# Patient Record
Sex: MALE
[De-identification: names, ages, dates, MRNs, and addresses within clinical notes are randomized per-mention and may not be internally consistent; named-entity substitution may affect disease eponyms.]

---

## 2020-07-09 PROBLEM — Z00.00 ENCOUNTER FOR PREVENTIVE HEALTH EXAMINATION: Status: ACTIVE | Noted: 2020-07-09

## 2020-08-26 ENCOUNTER — APPOINTMENT (OUTPATIENT)
Dept: UROLOGY | Facility: CLINIC | Age: 65
End: 2020-08-26

## 2020-09-02 ENCOUNTER — APPOINTMENT (OUTPATIENT)
Dept: UROLOGY | Facility: CLINIC | Age: 65
End: 2020-09-02
Payer: MEDICARE

## 2020-09-02 PROCEDURE — 99214 OFFICE O/P EST MOD 30 MIN: CPT | Mod: 95

## 2020-09-02 NOTE — ASSESSMENT
[FreeTextEntry1] : We discussed changes to improve his erections and to allow more spontaneity in is sexual activity.  I ordered for him tadalafil 5 mg daily to be taken daily. I also ordered UA, urine C+S and cytology, FISH and PSA testing for his LUTS and microhematuria. We will also get a scrotal US and a bladder US to check on the right spermatocele and his PVR and prostate. We will plan to meet in 3 months for TYLER. We ended the video portion of the visit at 9:51 AM. Aniya Riggs MD\par

## 2020-09-02 NOTE — HISTORY OF PRESENT ILLNESS
[Other Location: e.g. Home (Enter Location, City,State)___] : at [unfilled] [Home] : at home, [unfilled] , at the time of the visit. [Verbal consent obtained from patient] : the patient, [unfilled] [FreeTextEntry1] :  The patient-doctor relationship has been established in a face to face fashion via real time video/audio HIPAA compliant communication using telemedicine software. The patient's identity has been confirmed. The patient was previously emailed a copy of the telemedicine consent. They have had a chance to review and has now given verbal consent and has requested care to be assessed and treated through telemedicine and understands there maybe limitations in this process and they may need further follow up care in the office and or hospital settings. The patient was unable to connect via [employer] and requested that we use an alternative platform ([employer]).\par Verbal consent given on 9/2/2020, at 9:20 AM, by Mauricio Burks.\par \par This 65 year old male was last seen on 7/18/2018 for microhematuria, right spermatocele,variable PSA and ED. He comes for interval reassessment and treatment. He is working form home in [place] and is urinating with a good stream, good out put stable nocturia x 0-2 and no gross hematuria. \par \par As for his ED, he typically uses sildenafil 100 mg but over the last 4-5 months, he has had difficulty achieving a full erection. He also tells me that it is difficult to always plan in advance using the sildenafil. His right spermatocele sometimes appears to be larger.\par \par Patient remains on Lipitor for his cholesterol and has NKMA.  The patient denies fevers, chills, nausea and or vomiting and no unexplained weight loss. All pertinent parts of the patient PFSH (past medical, family and social histories), laboratory, radiological studies and physician notes were reviewed prior to starting the face to face portion of the telemedicine visit. Questionnaire results were discussed with patient.\par

## 2021-06-25 ENCOUNTER — APPOINTMENT (OUTPATIENT)
Dept: UROLOGY | Facility: CLINIC | Age: 66
End: 2021-06-25
Payer: MEDICARE

## 2021-06-25 VITALS
HEIGHT: 74 IN | DIASTOLIC BLOOD PRESSURE: 79 MMHG | HEART RATE: 76 BPM | BODY MASS INDEX: 23.1 KG/M2 | SYSTOLIC BLOOD PRESSURE: 135 MMHG | WEIGHT: 180 LBS | TEMPERATURE: 98.4 F

## 2021-06-25 PROCEDURE — 99214 OFFICE O/P EST MOD 30 MIN: CPT

## 2021-06-25 PROCEDURE — 51798 US URINE CAPACITY MEASURE: CPT

## 2021-06-25 PROCEDURE — 99072 ADDL SUPL MATRL&STAF TM PHE: CPT

## 2021-06-25 NOTE — HISTORY OF PRESENT ILLNESS
[FreeTextEntry1] :  This 65 year old male was  seen on 7/18/2018 for microhematuria, right spermatocele,variable PSA and ED. \par \par Patient seen 9/2/2020  for interval reassessment and treatment. He is working form home in [place] and is urinating with a good stream, good out put stable nocturia x 0-2 and no gross hematuria. \par \par As for his ED, he typically uses sildenafil 100 mg but over the last 4-5 months, he has had difficulty achieving a full erection. He also tells me that it is difficult to always plan in advance using the sildenafil. His right spermatocele sometimes appears to be larger. We changed to tadalafil 5 mg daily blood urine and US tests ordered.\par \par Patient seen TODAY  6/25/2021 to review results and reassess the microscopic hematuria, spermatoceles, PSA and ED. US bladder and scrotum from 10/23/2020 showed PVR 83 cc, prostate 40 gm, no testicular masses and bilateral stable spermatoceles.\par \par Patient told me that he feels good right now and is urinating well. He has also not noted any significant difference in the right spermatocele. HIs main issue is with his ED for which he is taking tadalafil 5 mg daily with 2 - 3 extra an hour before sexual activity. Despite this Rx, he still does not function consistently.\par \par UA trace protein\par IPSS 5/35\par JÚNIOR 17/25\par KAYLYN 2/10\par PVR 0\par \par Patient remains on Lipitor for his cholesterol and has NKMA.  The patient denies fevers, chills, nausea and or vomiting and no unexplained weight loss. All pertinent parts of the patient PFSH (past medical, family and social histories), laboratory, radiological studies and physician notes were reviewed prior to starting the face to face portion of the telemedicine visit. Questionnaire results were discussed with patient.\par

## 2021-06-25 NOTE — ASSESSMENT
[FreeTextEntry1] : We discussed the patient's ongoing issues with erectile dysfunction and I recommended that he maintain the tadalafil 5 mg daily.  However I recommended that he try sildenafil citrate 50 mg to 100 mg an hour before sexual activity to augment the tadalafil.  Both of these were ordered for him.  We discussed the indications risks alternatives and chances for success with this approach.  As for his stable right spermatocele, he will be due for an ultrasound in the next 2 to 3 months and I gave him the requisition to have this done in [place].  His microscopic hematuria is also stable and we did send urine today for culture cytology and fish test and blood for PSA exam.  If all remains stable I will see him in 1 year. Aniya Riggs MD\par

## 2021-06-25 NOTE — PHYSICAL EXAM
[General Appearance - Well Developed] : well developed [General Appearance - Well Nourished] : well nourished [Well Groomed] : well groomed [Normal Appearance] : normal appearance [General Appearance - In No Acute Distress] : no acute distress [Abdomen Soft] : soft [Abdomen Tenderness] : non-tender [Costovertebral Angle Tenderness] : no ~M costovertebral angle tenderness [Abdomen Hernia] : no hernia was discovered [Urethral Meatus] : meatus normal [Urinary Bladder Findings] : the bladder was normal on palpation [Penis Abnormality] : normal uncircumcised penis [Testes Tenderness] : no tenderness of the testes [Testes Mass (___cm)] : there were no testicular masses [Prostate Tenderness] : the prostate was not tender [No Prostate Nodules] : no prostate nodules [Prostate Size ___ (0-4)] : prostate size [unfilled] (scale: 0-4) [Skin Color & Pigmentation] : normal skin color and pigmentation [Skin Turgor] : supple [Edema] : no peripheral edema [] : no respiratory distress [Exaggerated Use Of Accessory Muscles For Inspiration] : no accessory muscle use [Oriented To Time, Place, And Person] : oriented to person, place, and time [Affect] : the affect was normal [Mood] : the mood was normal [Not Anxious] : not anxious [FreeTextEntry1] : stable moderate rigt spermatocele with no change from last exam.

## 2021-06-28 LAB
BACTERIA UR CULT: NORMAL
PSA SERPL-MCNC: 0.84 NG/ML

## 2021-06-29 LAB
HLX UV FISH FINAL REPORT: NORMAL
URINE CYTOLOGY: NORMAL

## 2022-04-13 ENCOUNTER — APPOINTMENT (OUTPATIENT)
Dept: UROLOGY | Facility: CLINIC | Age: 67
End: 2022-04-13
Payer: MEDICARE

## 2022-04-13 DIAGNOSIS — R35.1 NOCTURIA: ICD-10-CM

## 2022-04-13 DIAGNOSIS — N43.40 SPERMATOCELE OF EPIDIDYMIS, UNSPECIFIED: ICD-10-CM

## 2022-04-13 LAB
BILIRUB UR QL STRIP: NORMAL
CLARITY UR: CLEAR
COLLECTION METHOD: NORMAL
GLUCOSE UR-MCNC: NORMAL
HCG UR QL: 1 EU/DL
HGB UR QL STRIP.AUTO: NORMAL
KETONES UR-MCNC: NORMAL
LEUKOCYTE ESTERASE UR QL STRIP: NORMAL
NITRITE UR QL STRIP: NORMAL
PH UR STRIP: 7
PROT UR STRIP-MCNC: NORMAL
SP GR UR STRIP: 1.01

## 2022-04-13 PROCEDURE — 99214 OFFICE O/P EST MOD 30 MIN: CPT

## 2022-04-13 PROCEDURE — 81003 URINALYSIS AUTO W/O SCOPE: CPT | Mod: QW

## 2022-04-13 PROCEDURE — 51798 US URINE CAPACITY MEASURE: CPT

## 2022-04-13 RX ORDER — SILDENAFIL 100 MG/1
100 TABLET, FILM COATED ORAL
Qty: 30 | Refills: 6 | Status: ACTIVE | COMMUNITY
Start: 2021-06-25 | End: 1900-01-01

## 2022-04-13 NOTE — PHYSICAL EXAM
[General Appearance - Well Developed] : well developed [General Appearance - Well Nourished] : well nourished [Well Groomed] : well groomed [Normal Appearance] : normal appearance [General Appearance - In No Acute Distress] : no acute distress [Abdomen Soft] : soft [Abdomen Tenderness] : non-tender [Costovertebral Angle Tenderness] : no ~M costovertebral angle tenderness [Urethral Meatus] : meatus normal [Penis Abnormality] : normal circumcised penis [Urinary Bladder Findings] : the bladder was normal on palpation [Scrotum] : the scrotum was normal [Testes Tenderness] : no tenderness of the testes [Testes Mass (___cm)] : there were no testicular masses [Prostate Tenderness] : the prostate was not tender [No Prostate Nodules] : no prostate nodules [Prostate Size ___ (0-4)] : prostate size [unfilled] (scale: 0-4) [FreeTextEntry1] : Stable moderate right spermatocele, soft, no masses palpable. [Edema] : no peripheral edema [] : no respiratory distress [Respiration, Rhythm And Depth] : normal respiratory rhythm and effort [Exaggerated Use Of Accessory Muscles For Inspiration] : no accessory muscle use [Oriented To Time, Place, And Person] : oriented to person, place, and time [Affect] : the affect was normal [Mood] : the mood was normal [Not Anxious] : not anxious [Normal Station and Gait] : the gait and station were normal for the patient's age [No Focal Deficits] : no focal deficits

## 2022-04-13 NOTE — ASSESSMENT
[FreeTextEntry1] : We discussed patient's chronic microscopic hematuria which is not present in his urinalysis today.  Because of his past history I did send urine for culture and cytology.  If these are negative we will continue to follow this in 1 year.\par \par As for the BPH, his urinary symptoms are mild and no intervention is indicated at this time again we will follow this in 1 year.\par \par As for the ED, he has not enjoyed a good response to the combination of tadalafil 5 mg daily and sildenafil 100 mg as needed on top of this.  We discussed other options moving forward and this would require duplex ultrasound evaluation of the penis with ICI as the next step.  The goal would be to achieve a full rigid erection using ICI as a therapy if possible.  I explained the indications risks alternatives and chances for success with this approach in detail to Mr. Caballero.  He is considering this option but at the present time is not yet ready to embark on this path.  He requested and I reordered for him the tadalafil 5 mg at [facility] and the sildenafil 100 mg at [facility].  Blood was also sent for PSA test today.\par \par If the blood and urine studies remain stable, I will see him in 1 year unless he decides to proceed with penile blood flow testing before that. Aniya Riggs MD\par

## 2022-04-13 NOTE — HISTORY OF PRESENT ILLNESS
[FreeTextEntry1] : 66 YO M  seen on 7/18/2018 for microhematuria, right spermatocele,variable PSA and ED. \par \par Patient seen 9/2/2020  for interval reassessment and treatment. He is working form home in [place] and is urinating with a good stream, good out put stable nocturia x 0-2 and no gross hematuria. \par \par As for his ED, he typically uses sildenafil 100 mg but over the last 4-5 months, he has had difficulty achieving a full erection. He also tells me that it is difficult to always plan in advance using the sildenafil. His right spermatocele sometimes appears to be larger. We changed to tadalafil 5 mg daily blood urine and US tests ordered.\par \par Patient seen   6/25/2021 to review results and reassess the microscopic hematuria, spermatoceles, PSA and ED. US bladder and scrotum from 10/23/2020 showed PVR 83 cc, prostate 40 gm, no testicular masses and bilateral stable spermatoceles.\par Patient told me that he feels good right now and is urinating well. He has also not noted any significant difference in the right spermatocele. HIs main issue is with his ED for which he is taking tadalafil 5 mg daily with 2 - 3 extra an hour before sexual activity. Despite this Rx, he still does not function consistently.\par UA trace protein\par IPSS 5/35\par JÚNIOR 17/25\par KAYLYN 2/10\par PVR 0\par We discussed the patient's ongoing issues with erectile dysfunction and I recommended that he maintain the tadalafil 5 mg daily. However I recommended that he try sildenafil citrate 50 mg to 100 mg an hour before sexual activity to augment the tadalafil. Both of these were ordered for him. We discussed the indications risks alternatives and chances for success with this approach. As for his stable right spermatocele, he will be due for an ultrasound in the next 2 to 3 months and I gave him the requisition to have this done in [place]. His microscopic hematuria is also stable and we did send urine today for culture cytology and fish test and blood for PSA exam. If all remains stable I will see him in 1 year. \par C+S negative\par Cytology negative\par FISH negative\par PSA 0.84\par \par Patient seen TODAY 4/13/2022 to reassess these issues. He continues to urinate well, but also continues to have difficulty with his erections. He is presently taking tadalafil 5 mg daily and supplementing this with sildenafil 199 mg PRN 1 hour before sexual activity. He feels like there is a significant amount of performance anxiety since if things don't progress smoothly, then he gets anxious and everything goes downhill. \par \par Patient remains on Lipitor for his cholesterol and has NKMA.  The patient denies fevers, chills, nausea and or vomiting and no unexplained weight loss. All pertinent parts of the patient PFSH (past medical, family and social histories), laboratory, radiological studies and physician notes were reviewed prior to starting the face to face portion of the telemedicine visit. Questionnaire results were discussed with patient.\par

## 2022-04-14 LAB — PSA SERPL-MCNC: 1.72 NG/ML

## 2022-04-20 LAB
BACTERIA UR CULT: NORMAL
URINE CYTOLOGY: NORMAL

## 2022-05-24 ENCOUNTER — NON-APPOINTMENT (OUTPATIENT)
Age: 67
End: 2022-05-24

## 2022-06-30 ENCOUNTER — APPOINTMENT (OUTPATIENT)
Dept: UROLOGY | Facility: CLINIC | Age: 67
End: 2022-06-30

## 2022-06-30 VITALS — DIASTOLIC BLOOD PRESSURE: 82 MMHG | HEART RATE: 64 BPM | SYSTOLIC BLOOD PRESSURE: 172 MMHG | TEMPERATURE: 98 F

## 2022-06-30 PROCEDURE — 99213 OFFICE O/P EST LOW 20 MIN: CPT

## 2022-06-30 NOTE — ASSESSMENT
[FreeTextEntry1] : I reviewed with the patient the instructions to be followed prior to duplex penile ultrasound with intracavernous injection and the need to stop the medication for at least 3 to 4 days and optimally for 1 week in order for us to correctly assess his response to the ICI medication.  Since this is a completely elective procedure today, I recommended that we delay the actual procedure until next week so that he can follow these directions appropriately.  Patient agrees and we rescheduled the procedure for next week. Aniya Riggs MD\par

## 2022-06-30 NOTE — HISTORY OF PRESENT ILLNESS
[FreeTextEntry1] : 68 YO M  seen on 7/18/2018 for microhematuria, right spermatocele,variable PSA and ED. \par \par Patient seen 9/2/2020  for interval reassessment and treatment. He is working form home in [place] and is urinating with a good stream, good out put stable nocturia x 0-2 and no gross hematuria. \par \par As for his ED, he typically uses sildenafil 100 mg but over the last 4-5 months, he has had difficulty achieving a full erection. He also tells me that it is difficult to always plan in advance using the sildenafil. His right spermatocele sometimes appears to be larger. We changed to tadalafil 5 mg daily blood urine and US tests ordered.\par \par Patient seen   6/25/2021 to review results and reassess the microscopic hematuria, spermatoceles, PSA and ED. US bladder and scrotum from 10/23/2020 showed PVR 83 cc, prostate 40 gm, no testicular masses and bilateral stable spermatoceles.\par Patient told me that he feels good right now and is urinating well. He has also not noted any significant difference in the right spermatocele. HIs main issue is with his ED for which he is taking tadalafil 5 mg daily with 2 - 3 extra an hour before sexual activity. Despite this Rx, he still does not function consistently.\par UA trace protein\par IPSS 5/35\par JÚNIOR 17/25\par KAYLYN 2/10\par PVR 0\par We discussed the patient's ongoing issues with erectile dysfunction and I recommended that he maintain the tadalafil 5 mg daily. However I recommended that he try sildenafil citrate 50 mg to 100 mg an hour before sexual activity to augment the tadalafil. Both of these were ordered for him. We discussed the indications risks alternatives and chances for success with this approach. As for his stable right spermatocele, he will be due for an ultrasound in the next 2 to 3 months and I gave him the requisition to have this done in [place]. His microscopic hematuria is also stable and we did send urine today for culture cytology and fish test and blood for PSA exam. If all remains stable I will see him in 1 year. \par C+S negative\par Cytology negative\par FISH negative\par PSA 0.84\par \par Patient seen  4/13/2022 to reassess these issues. He continues to urinate well, but also continues to have difficulty with his erections. He is presently taking tadalafil 5 mg daily and supplementing this with sildenafil 199 mg PRN 1 hour before sexual activity. He feels like there is a significant amount of performance anxiety since if things don't progress smoothly, then he gets anxious and everything goes downhill. \par We discussed patient's chronic microscopic hematuria which is not present in his urinalysis today. Because of his past history I did send urine for culture and cytology. If these are negative we will continue to follow this in 1 year.\par As for the BPH, his urinary symptoms are mild and no intervention is indicated at this time again we will follow this in 1 year.\par As for the ED, he has not enjoyed a good response to the combination of tadalafil 5 mg daily and sildenafil 100 mg as needed on top of this. We discussed other options moving forward and this would require duplex ultrasound evaluation of the penis with ICI as the next step. The goal would be to achieve a full rigid erection using ICI as a therapy if possible. I explained the indications risks alternatives and chances for success with this approach in detail to Mr. Caballero. He is considering this option but at the present time is not yet ready to embark on this path. He requested and I reordered for him the tadalafil 5 mg at [facility] and the sildenafil 100 mg at [facility]. Blood was also sent for PSA test today.\par If the blood and urine studies remain stable, I will see him in 1 year unless he decides to proceed with penile blood flow testing before that. \par \par Patient seen TODAY 6/30/2022 for Duplex US. He informed me that he has not had an adequate response to the double PO PDE5i medication, and scheduled the Penile US. H also told me that he took his last dose of Tadalafil last night.\par \par Patient remains on Lipitor for his cholesterol and has NKMA.  The patient denies fevers, chills, nausea and or vomiting and no unexplained weight loss. All pertinent parts of the patient PFSH (past medical, family and social histories), laboratory, radiological studies and physician notes were reviewed prior to starting the face to face portion of the telemedicine visit. Questionnaire results were discussed with patient.\par

## 2022-07-06 ENCOUNTER — APPOINTMENT (OUTPATIENT)
Dept: UROLOGY | Facility: CLINIC | Age: 67
End: 2022-07-06

## 2022-07-06 VITALS
WEIGHT: 182 LBS | TEMPERATURE: 98.3 F | HEIGHT: 74 IN | BODY MASS INDEX: 23.36 KG/M2 | SYSTOLIC BLOOD PRESSURE: 130 MMHG | HEART RATE: 81 BPM | DIASTOLIC BLOOD PRESSURE: 76 MMHG

## 2022-07-06 PROCEDURE — 54235 NJX CORPORA CAVERNOSA RX AGT: CPT

## 2022-07-06 PROCEDURE — 99213 OFFICE O/P EST LOW 20 MIN: CPT | Mod: 25

## 2022-07-06 PROCEDURE — 93980 PENILE VASCULAR STUDY: CPT

## 2022-07-06 NOTE — ASSESSMENT
[FreeTextEntry1] : These findings are consistent with arterial ED with good response to a small amount of intracavernous injection therapy but poor response to significant double agent oral medication therapy.  I reviewed the option of ICI as a treatment with the patient and he would like to proceed along this course after we reviewed the indications risks alternatives and chances for success.  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  I reviewed with the patient 4-hour rule concerning prolonged erections and what to do if that should happen.  Aniya Riggs MD\par

## 2022-07-06 NOTE — HISTORY OF PRESENT ILLNESS
[FreeTextEntry1] : 68 YO M  seen on 7/18/2018 for microhematuria, right spermatocele,variable PSA and ED. \par \par Patient seen 9/2/2020  for interval reassessment and treatment. He is working form home in [place] and is urinating with a good stream, good out put stable nocturia x 0-2 and no gross hematuria. \par \par As for his ED, he typically uses sildenafil 100 mg but over the last 4-5 months, he has had difficulty achieving a full erection. He also tells me that it is difficult to always plan in advance using the sildenafil. His right spermatocele sometimes appears to be larger. We changed to tadalafil 5 mg daily blood urine and US tests ordered.\par \par Patient seen   6/25/2021 to review results and reassess the microscopic hematuria, spermatoceles, PSA and ED. US bladder and scrotum from 10/23/2020 showed PVR 83 cc, prostate 40 gm, no testicular masses and bilateral stable spermatoceles.\par Patient told me that he feels good right now and is urinating well. He has also not noted any significant difference in the right spermatocele. HIs main issue is with his ED for which he is taking tadalafil 5 mg daily with 2 - 3 extra an hour before sexual activity. Despite this Rx, he still does not function consistently.\par UA trace protein\par IPSS 5/35\par JÚNIOR 17/25\par KAYLYN 2/10\par PVR 0\par We discussed the patient's ongoing issues with erectile dysfunction and I recommended that he maintain the tadalafil 5 mg daily. However I recommended that he try sildenafil citrate 50 mg to 100 mg an hour before sexual activity to augment the tadalafil. Both of these were ordered for him. We discussed the indications risks alternatives and chances for success with this approach. As for his stable right spermatocele, he will be due for an ultrasound in the next 2 to 3 months and I gave him the requisition to have this done in [place]. His microscopic hematuria is also stable and we did send urine today for culture cytology and fish test and blood for PSA exam. If all remains stable I will see him in 1 year. \par C+S negative\par Cytology negative\par FISH negative\par PSA 0.84\par \par Patient seen  4/13/2022 to reassess these issues. He continues to urinate well, but also continues to have difficulty with his erections. He is presently taking tadalafil 5 mg daily and supplementing this with sildenafil 199 mg PRN 1 hour before sexual activity. He feels like there is a significant amount of performance anxiety since if things don't progress smoothly, then he gets anxious and everything goes downhill. \par We discussed patient's chronic microscopic hematuria which is not present in his urinalysis today. Because of his past history I did send urine for culture and cytology. If these are negative we will continue to follow this in 1 year.\par As for the BPH, his urinary symptoms are mild and no intervention is indicated at this time again we will follow this in 1 year.\par As for the ED, he has not enjoyed a good response to the combination of tadalafil 5 mg daily and sildenafil 100 mg as needed on top of this. We discussed other options moving forward and this would require duplex ultrasound evaluation of the penis with ICI as the next step. The goal would be to achieve a full rigid erection using ICI as a therapy if possible. I explained the indications risks alternatives and chances for success with this approach in detail to Mr. Caballero. He is considering this option but at the present time is not yet ready to embark on this path. He requested and I reordered for him the tadalafil 5 mg at [facility] and the sildenafil 100 mg at [facility]. Blood was also sent for PSA test today.\par If the blood and urine studies remain stable, I will see him in 1 year unless he decides to proceed with penile blood flow testing before that. \par \par Patient seen  6/30/2022 for Duplex US. He informed me that he has not had an adequate response to the double PO PDE5i medication, and scheduled the Penile US. H also told me that he took his last dose of Tadalafil last night.\par \par Patient seen TODAY 7/6/2022 for Duplex Penile US.\par \par Penile Duplex US wit TRH/STD (5.88/18/0.6):\par Cavernous artery diameters:\par      Pre ICI: right 0.4 mm, left 0.6 mm\par      Post ICI right 0.9 mm, left 0.9 mm\par Maximum Arterial Velocities:\par      Right 46 cm/s at 23 minutes after 0.5 cc injection and manual stimulation.\par      Left 60+ cm/s at 19 minutes after 0.5 cc injection and manual stimulation.\par Minimum Diastolic Arterial Velocities:\par      Right 2.5 cm/s\par      Left 2.2 cm/s\par Response: Full rigid erection\par Reassess at 65 minutes - semi erect.\par At 105 minutes - softer, not completely flaccid.\par At 180 minutes completely flaccid without intervention.\par \par Patient remains on Lipitor for his cholesterol and has NKMA.  The patient denies fevers, chills, nausea and or vomiting and no unexplained weight loss. All pertinent parts of the patient PFSH (past medical, family and social histories), laboratory, radiological studies and physician notes were reviewed prior to starting the face to face portion of the telemedicine visit. Questionnaire results were discussed with patient.\par

## 2022-07-21 ENCOUNTER — APPOINTMENT (OUTPATIENT)
Dept: UROLOGY | Facility: CLINIC | Age: 67
End: 2022-07-21

## 2022-07-21 VITALS
SYSTOLIC BLOOD PRESSURE: 137 MMHG | HEIGHT: 74 IN | HEART RATE: 83 BPM | TEMPERATURE: 98.2 F | BODY MASS INDEX: 23.1 KG/M2 | DIASTOLIC BLOOD PRESSURE: 83 MMHG | WEIGHT: 180 LBS | RESPIRATION RATE: 18 BRPM

## 2022-07-21 PROCEDURE — 99214 OFFICE O/P EST MOD 30 MIN: CPT

## 2022-07-21 NOTE — HISTORY OF PRESENT ILLNESS
[FreeTextEntry1] : 66 YO M  seen on 7/18/2018 for microhematuria, right spermatocele,variable PSA and ED. \par \par Patient seen 9/2/2020  for interval reassessment and treatment. He is working form home in [place] and is urinating with a good stream, good out put stable nocturia x 0-2 and no gross hematuria. \par \par As for his ED, he typically uses sildenafil 100 mg but over the last 4-5 months, he has had difficulty achieving a full erection. He also tells me that it is difficult to always plan in advance using the sildenafil. His right spermatocele sometimes appears to be larger. We changed to tadalafil 5 mg daily blood urine and US tests ordered.\par \par Patient seen   6/25/2021 to review results and reassess the microscopic hematuria, spermatoceles, PSA and ED. US bladder and scrotum from 10/23/2020 showed PVR 83 cc, prostate 40 gm, no testicular masses and bilateral stable spermatoceles.\par Patient told me that he feels good right now and is urinating well. He has also not noted any significant difference in the right spermatocele. HIs main issue is with his ED for which he is taking tadalafil 5 mg daily with 2 - 3 extra an hour before sexual activity. Despite this Rx, he still does not function consistently.\par UA trace protein\par IPSS 5/35\par JÚNIOR 17/25\par KAYLYN 2/10\par PVR 0\par We discussed the patient's ongoing issues with erectile dysfunction and I recommended that he maintain the tadalafil 5 mg daily. However I recommended that he try sildenafil citrate 50 mg to 100 mg an hour before sexual activity to augment the tadalafil. Both of these were ordered for him. We discussed the indications risks alternatives and chances for success with this approach. As for his stable right spermatocele, he will be due for an ultrasound in the next 2 to 3 months and I gave him the requisition to have this done in [place]. His microscopic hematuria is also stable and we did send urine today for culture cytology and fish test and blood for PSA exam. If all remains stable I will see him in 1 year. \par C+S negative\par Cytology negative\par FISH negative\par PSA 0.84\par \par Patient seen  4/13/2022 to reassess these issues. He continues to urinate well, but also continues to have difficulty with his erections. He is presently taking tadalafil 5 mg daily and supplementing this with sildenafil 199 mg PRN 1 hour before sexual activity. He feels like there is a significant amount of performance anxiety since if things don't progress smoothly, then he gets anxious and everything goes downhill. \par We discussed patient's chronic microscopic hematuria which is not present in his urinalysis today. Because of his past history I did send urine for culture and cytology. If these are negative we will continue to follow this in 1 year.\par As for the BPH, his urinary symptoms are mild and no intervention is indicated at this time again we will follow this in 1 year.\par As for the ED, he has not enjoyed a good response to the combination of tadalafil 5 mg daily and sildenafil 100 mg as needed on top of this. We discussed other options moving forward and this would require duplex ultrasound evaluation of the penis with ICI as the next step. The goal would be to achieve a full rigid erection using ICI as a therapy if possible. I explained the indications risks alternatives and chances for success with this approach in detail to Mr. Caballero. He is considering this option but at the present time is not yet ready to embark on this path. He requested and I reordered for him the tadalafil 5 mg at [facility] and the sildenafil 100 mg at [facility]. Blood was also sent for PSA test today.\par If the blood and urine studies remain stable, I will see him in 1 year unless he decides to proceed with penile blood flow testing before that. \par \par Patient seen  6/30/2022 for Duplex US. He informed me that he has not had an adequate response to the double PO PDE5i medication, and scheduled the Penile US. H also told me that he took his last dose of Tadalafil last night.\par \par Patient seen  7/6/2022 for Duplex Penile US.\par \par Penile Duplex US wit TRH/STD (5.88/18/0.6):\par Cavernous artery diameters:\par      Pre ICI: right 0.4 mm, left 0.6 mm\par      Post ICI right 0.9 mm, left 0.9 mm\par Maximum Arterial Velocities:\par      Right 46 cm/s at 23 minutes after 0.5 cc injection and manual stimulation.\par      Left 60+ cm/s at 19 minutes after 0.5 cc injection and manual stimulation.\par Minimum Diastolic Arterial Velocities:\par      Right 2.5 cm/s\par      Left 2.2 cm/s\par Response: Full rigid erection\par Reassess at 65 minutes - semi erect.\par At 105 minutes - softer, not completely flaccid.\par At 180 minutes completely flaccid without intervention.\par \par These findings are consistent with arterial ED with good response to a small amount of intracavernous injection therapy but poor response to significant double agent oral medication therapy. I reviewed the option of ICI as a treatment with the patient and he would like to proceed along this course after we reviewed the indications risks alternatives and chances for success. To that effect we made plans for him to return to learn teaching using my technique.\par We will use 0.3 cc of TRI?STD 5.88 per injection. I reviewed with the patient 4-hour rule concerning prolonged erections and what to do if that should happen. \par \par Patient seen TODAY 7/21/2022 to learn ICI. He informed me that he had no untoward effects from the ICI associated with the Duplex Penile US.\par \par Patient remains on Lipitor for his cholesterol and has NKMA.  The patient denies fevers, chills, nausea and or vomiting and no unexplained weight loss. All pertinent parts of the patient PFSH (past medical, family and social histories), laboratory, radiological studies and physician notes were reviewed prior to starting the face to face portion of the telemedicine visit. Questionnaire results were discussed with patient.\par

## 2022-07-21 NOTE — ASSESSMENT
[FreeTextEntry1] : After reviewing the written information given to him on last visit and this visit, the patient was taught intracavernosal injection therapy using my technique using a combination of Trimix 5.88 at 0.3 cc/inj.  This teaching involved initial observation of the technique while I prepared the medication and injected into the penile model followed by patient preparation of the medication and syringe and self injection under my guidance and observation.  When the patient performed this self injection sufficiently for his and my satisfaction, then he was given the approval to use self injection therapy with the medicine that he had previously purchased from [employer].\par \par We reviewed the risks potential complications including prolonged erection, we reviewed the steps to be taken if such a prolonged erection should occur and parameters for when to seek more emergent therapy.  We also reviewed the need to not use tadalafil orally in combination with the injection therapy and the delay which is necessary between the use of any other medicine for ED therapy.\par \par Plans were made to reassess in 3 months, and patient will call when he requires further medication if before that time. Aniya Riggs MD\par

## 2022-07-21 NOTE — PHYSICAL EXAM
[Urethral Meatus] : meatus normal [Penis Abnormality] : normal circumcised penis [FreeTextEntry1] : Optimal sites for self injection identified and revealed to patient.

## 2023-02-08 ENCOUNTER — APPOINTMENT (OUTPATIENT)
Dept: UROLOGY | Facility: CLINIC | Age: 68
End: 2023-02-08
Payer: MEDICARE

## 2023-02-08 VITALS
OXYGEN SATURATION: 96 % | BODY MASS INDEX: 23.5 KG/M2 | SYSTOLIC BLOOD PRESSURE: 155 MMHG | DIASTOLIC BLOOD PRESSURE: 88 MMHG | WEIGHT: 183 LBS | HEART RATE: 87 BPM

## 2023-02-08 DIAGNOSIS — R31.21 ASYMPTOMATIC MICROSCOPIC HEMATURIA: ICD-10-CM

## 2023-02-08 PROCEDURE — 81003 URINALYSIS AUTO W/O SCOPE: CPT | Mod: QW

## 2023-02-08 PROCEDURE — 99213 OFFICE O/P EST LOW 20 MIN: CPT

## 2023-02-08 NOTE — PHYSICAL EXAM
[General Appearance - Well Developed] : well developed [General Appearance - Well Nourished] : well nourished [Normal Appearance] : normal appearance [Well Groomed] : well groomed [General Appearance - In No Acute Distress] : no acute distress [Urethral Meatus] : meatus normal [Penis Abnormality] : normal circumcised penis [FreeTextEntry1] : No palpable penile fibrosis or ecchymosis. [Oriented To Time, Place, And Person] : oriented to person, place, and time [Affect] : the affect was normal [Mood] : the mood was normal [Not Anxious] : not anxious

## 2023-02-08 NOTE — ASSESSMENT
[FreeTextEntry1] : We discussed the patient's very satisfactory result to intracavernous injection therapy and I recommended that he maintain this technique for his treatment of ED.  Reviewed the amount to inject and I suggested he try 0.3 mL/inj. to determine if he could maintain the same rigidity but decrease the duration slightly.  We also reviewed the 4-hour rule for prolonged erections and how to proceed if a prolonged erection should occur.  I gave him again written information concerning the medication and dosage to use (Sudafed).\par \par As for his BPH and PSA, he will be due for PSA and TYLER evaluation in the summer and will make plans to schedule that. Aniya Riggs MD\par

## 2023-02-08 NOTE — HISTORY OF PRESENT ILLNESS
[FreeTextEntry1] : 68 YO M  seen on 7/18/2018 for microhematuria, right spermatocele,variable PSA and ED. \par \par Patient seen 9/2/2020  for interval reassessment and treatment. He is working form home in [place] and is urinating with a good stream, good out put stable nocturia x 0-2 and no gross hematuria. \par \par As for his ED, he typically uses sildenafil 100 mg but over the last 4-5 months, he has had difficulty achieving a full erection. He also tells me that it is difficult to always plan in advance using the sildenafil. His right spermatocele sometimes appears to be larger. We changed to tadalafil 5 mg daily blood urine and US tests ordered.\par \par Patient seen   6/25/2021 to review results and reassess the microscopic hematuria, spermatoceles, PSA and ED. US bladder and scrotum from 10/23/2020 showed PVR 83 cc, prostate 40 gm, no testicular masses and bilateral stable spermatoceles.\par Patient told me that he feels good right now and is urinating well. He has also not noted any significant difference in the right spermatocele. HIs main issue is with his ED for which he is taking tadalafil 5 mg daily with 2 - 3 extra an hour before sexual activity. Despite this Rx, he still does not function consistently.\par UA trace protein\par IPSS 5/35\par JÚNIOR 17/25\par KAYLYN 2/10\par PVR 0\par We discussed the patient's ongoing issues with erectile dysfunction and I recommended that he maintain the tadalafil 5 mg daily. However I recommended that he try sildenafil citrate 50 mg to 100 mg an hour before sexual activity to augment the tadalafil. Both of these were ordered for him. We discussed the indications risks alternatives and chances for success with this approach. As for his stable right spermatocele, he will be due for an ultrasound in the next 2 to 3 months and I gave him the requisition to have this done in [place]. His microscopic hematuria is also stable and we did send urine today for culture cytology and fish test and blood for PSA exam. If all remains stable I will see him in 1 year. \par C+S negative\par Cytology negative\par FISH negative\par PSA 0.84\par \par Patient seen  4/13/2022 to reassess these issues. He continues to urinate well, but also continues to have difficulty with his erections. He is presently taking tadalafil 5 mg daily and supplementing this with sildenafil 199 mg PRN 1 hour before sexual activity. He feels like there is a significant amount of performance anxiety since if things don't progress smoothly, then he gets anxious and everything goes downhill. \par We discussed patient's chronic microscopic hematuria which is not present in his urinalysis today. Because of his past history I did send urine for culture and cytology. If these are negative we will continue to follow this in 1 year.\par As for the BPH, his urinary symptoms are mild and no intervention is indicated at this time again we will follow this in 1 year.\par As for the ED, he has not enjoyed a good response to the combination of tadalafil 5 mg daily and sildenafil 100 mg as needed on top of this. We discussed other options moving forward and this would require duplex ultrasound evaluation of the penis with ICI as the next step. The goal would be to achieve a full rigid erection using ICI as a therapy if possible. I explained the indications risks alternatives and chances for success with this approach in detail to Mr. Caballero. He is considering this option but at the present time is not yet ready to embark on this path. He requested and I reordered for him the tadalafil 5 mg at [facility] and the sildenafil 100 mg at [facility]. Blood was also sent for PSA test today.\par If the blood and urine studies remain stable, I will see him in 1 year unless he decides to proceed with penile blood flow testing before that. \par \par Patient seen  6/30/2022 for Duplex US. He informed me that he has not had an adequate response to the double PO PDE5i medication, and scheduled the Penile US. H also told me that he took his last dose of Tadalafil last night.\par \par Patient seen  7/6/2022 for Duplex Penile US.\par \par Penile Duplex US wit TRH/STD (5.88/18/0.6):\par Cavernous artery diameters:\par      Pre ICI: right 0.4 mm, left 0.6 mm\par      Post ICI right 0.9 mm, left 0.9 mm\par Maximum Arterial Velocities:\par      Right 46 cm/s at 23 minutes after 0.5 cc injection and manual stimulation.\par      Left 60+ cm/s at 19 minutes after 0.5 cc injection and manual stimulation.\par Minimum Diastolic Arterial Velocities:\par      Right 2.5 cm/s\par      Left 2.2 cm/s\par Response: Full rigid erection\par Reassess at 65 minutes - semi erect.\par At 105 minutes - softer, not completely flaccid.\par At 180 minutes completely flaccid without intervention.\par \par These findings are consistent with arterial ED with good response to a small amount of intracavernous injection therapy but poor response to significant double agent oral medication therapy. I reviewed the option of ICI as a treatment with the patient and he would like to proceed along this course after we reviewed the indications risks alternatives and chances for success. To that effect we made plans for him to return to learn teaching using my technique.\par We will use 0.3 cc of TRI?STD 5.88 per injection. I reviewed with the patient 4-hour rule concerning prolonged erections and what to do if that should happen. \par \par Patient seen  7/21/2022 to learn ICI. He informed me that he had no untoward effects from the ICI associated with the Duplex Penile US.\par After reviewing the written information given to him on last visit and this visit, the patient was taught intracavernosal injection therapy using my technique using a combination of Trimix 5.88 at 0.3 cc/inj. This teaching involved initial observation of the technique while I prepared the medication and injected into the penile model followed by patient preparation of the medication and syringe and self injection under my guidance and observation. When the patient performed this self injection sufficiently for his and my satisfaction, then he was given the approval to use self injection therapy with the medicine that he had previously purchased from [employer].\par We reviewed the risks potential complications including prolonged erection, we reviewed the steps to be taken if such a prolonged erection should occur and parameters for when to seek more emergent therapy. We also reviewed the need to not use tadalafil orally in combination with the injection therapy and the delay which is necessary between the use of any other medicine for ED therapy.\par Plans were made to reassess in 3 months, and patient will call when he requires further medication if before that time. \par \par Patient seen TODAY 2/8/2023 to reassess. Also due for PDA, TYLER 4/2023. e has been using TRI/STD 5.88 at 0.35 ml/injection with a very satisfactory result that typically lasts 2 hours and spontaneous detumescence.\par UA negative\par JÚNIOR 15\par \par Patient remains on Lipitor for his cholesterol and has NKMA.  The patient denies fevers, chills, nausea and or vomiting and no unexplained weight loss. All pertinent parts of the patient PFSH (past medical, family and social histories), laboratory, radiological studies and physician notes were reviewed prior to starting the face to face portion of the telemedicine visit. Questionnaire results were discussed with patient.\par

## 2023-08-03 ENCOUNTER — APPOINTMENT (OUTPATIENT)
Dept: UROLOGY | Facility: CLINIC | Age: 68
End: 2023-08-03
Payer: MEDICARE

## 2023-08-03 VITALS — DIASTOLIC BLOOD PRESSURE: 87 MMHG | HEART RATE: 52 BPM | OXYGEN SATURATION: 97 % | SYSTOLIC BLOOD PRESSURE: 135 MMHG

## 2023-08-03 LAB
BILIRUB UR QL STRIP: NORMAL
CLARITY UR: CLEAR
COLLECTION METHOD: NORMAL
GLUCOSE UR-MCNC: NORMAL
HCG UR QL: 1 EU/DL
HGB UR QL STRIP.AUTO: NORMAL
KETONES UR-MCNC: NORMAL
LEUKOCYTE ESTERASE UR QL STRIP: NORMAL
NITRITE UR QL STRIP: NORMAL
PH UR STRIP: 5.5
PROT UR STRIP-MCNC: NORMAL
SP GR UR STRIP: 1.02

## 2023-08-03 PROCEDURE — 81003 URINALYSIS AUTO W/O SCOPE: CPT | Mod: QW

## 2023-08-03 PROCEDURE — 99214 OFFICE O/P EST MOD 30 MIN: CPT

## 2023-08-03 RX ORDER — TADALAFIL 5 MG/1
5 TABLET ORAL
Qty: 90 | Refills: 3 | Status: ACTIVE | COMMUNITY
Start: 2020-09-02 | End: 1900-01-01

## 2023-08-03 NOTE — PHYSICAL EXAM
[General Appearance - Well Developed] : well developed [General Appearance - Well Nourished] : well nourished [Normal Appearance] : normal appearance [Well Groomed] : well groomed [General Appearance - In No Acute Distress] : no acute distress [Oriented To Time, Place, And Person] : oriented to person, place, and time [Affect] : the affect was normal [Mood] : the mood was normal [Not Anxious] : not anxious [Urethral Meatus] : meatus normal [Penis Abnormality] : normal circumcised penis [Testes Tenderness] : no tenderness of the testes [Testes Mass (___cm)] : there were no testicular masses [Prostate Tenderness] : the prostate was not tender [No Prostate Nodules] : no prostate nodules [Prostate Size ___ (0-4)] : prostate size [unfilled] (scale: 0-4) [FreeTextEntry1] : No palpable penile fibrosis or ecchymosis. stable right spermatocele [Edema] : no peripheral edema [] : no respiratory distress [Respiration, Rhythm And Depth] : normal respiratory rhythm and effort [Exaggerated Use Of Accessory Muscles For Inspiration] : no accessory muscle use

## 2023-08-03 NOTE — HISTORY OF PRESENT ILLNESS
[FreeTextEntry1] : 89YO M  seen on 7/18/2018 for microhematuria, right spermatocele, variable PSA and ED.   Patient seen 9/2/2020  for interval reassessment and treatment. He is working form home in [place] and is urinating with a good stream, good out put stable nocturia x 0-2 and no gross hematuria.   As for his ED, he typically uses sildenafil 100 mg but over the last 4-5 months, he has had difficulty achieving a full erection. He also tells me that it is difficult to always plan in advance using the sildenafil. His right spermatocele sometimes appears to be larger. We changed to tadalafil 5 mg daily blood urine and US tests ordered.  Patient seen   6/25/2021 to review results and reassess the microscopic hematuria, spermatoceles, PSA and ED. US bladder and scrotum from 10/23/2020 showed PVR 83 cc, prostate 40 gm, no testicular masses and bilateral stable spermatoceles. Patient told me that he feels good right now and is urinating well. He has also not noted any significant difference in the right spermatocele. HIs main issue is with his ED for which he is taking tadalafil 5 mg daily with 2 - 3 extra an hour before sexual activity. Despite this Rx, he still does not function consistently. UA trace protein IPSS 5/35 JÚNIOR 17/25 KAYLYN 2/10 PVR 0 We discussed the patient's ongoing issues with erectile dysfunction and I recommended that he maintain the tadalafil 5 mg daily. However I recommended that he try sildenafil citrate 50 mg to 100 mg an hour before sexual activity to augment the tadalafil. Both of these were ordered for him. We discussed the indications risks alternatives and chances for success with this approach. As for his stable right spermatocele, he will be due for an ultrasound in the next 2 to 3 months and I gave him the requisition to have this done in [place]. His microscopic hematuria is also stable and we did send urine today for culture cytology and fish test and blood for PSA exam. If all remains stable I will see him in 1 year.  C+S negative Cytology negative FISH negative PSA 0.84  Patient seen  4/13/2022 to reassess these issues. He continues to urinate well, but also continues to have difficulty with his erections. He is presently taking tadalafil 5 mg daily and supplementing this with sildenafil 199 mg PRN 1 hour before sexual activity. He feels like there is a significant amount of performance anxiety since if things don't progress smoothly, then he gets anxious and everything goes downhill.  We discussed patient's chronic microscopic hematuria which is not present in his urinalysis today. Because of his past history I did send urine for culture and cytology. If these are negative we will continue to follow this in 1 year. As for the BPH, his urinary symptoms are mild and no intervention is indicated at this time again we will follow this in 1 year. As for the ED, he has not enjoyed a good response to the combination of tadalafil 5 mg daily and sildenafil 100 mg as needed on top of this. We discussed other options moving forward and this would require duplex ultrasound evaluation of the penis with ICI as the next step. The goal would be to achieve a full rigid erection using ICI as a therapy if possible. I explained the indications risks alternatives and chances for success with this approach in detail to Mr. Caballero. He is considering this option but at the present time is not yet ready to embark on this path. He requested and I reordered for him the tadalafil 5 mg at [facility] and the sildenafil 100 mg at [facility]. Blood was also sent for PSA test today. If the blood and urine studies remain stable, I will see him in 1 year unless he decides to proceed with penile blood flow testing before that.   Patient seen  6/30/2022 for Duplex US. He informed me that he has not had an adequate response to the double PO PDE5i medication, and scheduled the Penile US. H also told me that he took his last dose of Tadalafil last night.  Patient seen  7/6/2022 for Duplex Penile US.  Penile Duplex US wit TRI/STD (5.88/18/0.6): Cavernous artery diameters:      Pre ICI: right 0.4 mm, left 0.6 mm      Post ICI right 0.9 mm, left 0.9 mm Maximum Arterial Velocities:      Right 46 cm/s at 23 minutes after 0.5 cc injection and manual stimulation.      Left 60+ cm/s at 19 minutes after 0.5 cc injection and manual stimulation. Minimum Diastolic Arterial Velocities:      Right 2.5 cm/s      Left 2.2 cm/s Response: Full rigid erection Reassess at 65 minutes - semi erect. At 105 minutes - softer, not completely flaccid. At 180 minutes completely flaccid without intervention.  These findings are consistent with arterial ED with good response to a small amount of intracavernous injection therapy but poor response to significant double agent oral medication therapy. I reviewed the option of ICI as a treatment with the patient and he would like to proceed along this course after we reviewed the indications risks alternatives and chances for success. To that effect we made plans for him to return to learn teaching using my technique. We will use 0.3 cc of TRI?STD 5.88 per injection. I reviewed with the patient 4-hour rule concerning prolonged erections and what to do if that should happen.   Patient seen  7/21/2022 to learn ICI. He informed me that he had no untoward effects from the ICI associated with the Duplex Penile US. After reviewing the written information given to him on last visit and this visit, the patient was taught intracavernosal injection therapy using my technique using a combination of Trimix 5.88 at 0.3 cc/inj. This teaching involved initial observation of the technique while I prepared the medication and injected into the penile model followed by patient preparation of the medication and syringe and self injection under my guidance and observation. When the patient performed this self injection sufficiently for his and my satisfaction, then he was given the approval to use self injection therapy with the medicine that he had previously purchased from [employer]. We reviewed the risks potential complications including prolonged erection, we reviewed the steps to be taken if such a prolonged erection should occur and parameters for when to seek more emergent therapy. We also reviewed the need to not use tadalafil orally in combination with the injection therapy and the delay which is necessary between the use of any other medicine for ED therapy. Plans were made to reassess in 3 months, and patient will call when he requires further medication if before that time.   Patient seen  2/8/2023 to reassess. Also due for PDA, TYLER 4/2023. e has been using TRI/STD 5.88 at 0.35 ml/injection with a very satisfactory result that typically lasts 2 hours and spontaneous detumescence. UA negative JÚNIOR 15 Patient remains on Lipitor for his cholesterol and has NKMA.  The patient denies fevers, chills, nausea and or vomiting and no unexplained weight loss. All pertinent parts of the patient PFSH (past medical, family and social histories), laboratory, radiological studies and physician notes were reviewed prior to starting the face to face portion of the telemedicine visit. Questionnaire results were discussed with patient. We discussed the patient's very satisfactory result to intracavernous injection therapy and I recommended that he maintain this technique for his treatment of ED. Reviewed the amount to inject and I suggested he try 0.3 mL/inj. to determine if he could maintain the same rigidity but decrease the duration slightly. We also reviewed the 4-hour rule for prolonged erections and how to proceed if a prolonged erection should occur. I gave him again written information concerning the medication and dosage to use (Sudafed). As for his BPH and PSA, he will be due for PSA and TYLER evaluation in the summer and will make plans to schedule that.  Patient seen TODAY 8/3/2023 to reassess ICI and for PSA, TYLER. He continues on ICI, injecting 30-35 units with erections lasting 3-4 hours.   UA negative IPSS 10 JÚNIOR 16 KAYLYN 1

## 2023-08-03 NOTE — ASSESSMENT
[FreeTextEntry1] : We discussed his current dose of TRI/STD and risk of priapism. I advise that he decrease his dose to 25 units to ensure safe use. We also discussed alternatively switching back to daily tadalafil 5 mg, since he prefers the spontaneity effect of the medication. I advised that if he proceeds with this approach, he should wait 3 full days before changing from one to the other medication. Patient should NOT take both medicines at the same time. TRI/STD was renewed to [facility], and tadalafil was sent to [facility] as requested. A PSA is also pending and we will call him with that result. If there are no new problems, he will reassess in 6 - 12 months. Aniya Riggs MD

## 2023-08-04 ENCOUNTER — NON-APPOINTMENT (OUTPATIENT)
Age: 68
End: 2023-08-04

## 2023-08-04 LAB — PSA SERPL-MCNC: 1.33 NG/ML

## 2024-04-04 ENCOUNTER — APPOINTMENT (OUTPATIENT)
Dept: UROLOGY | Facility: CLINIC | Age: 69
End: 2024-04-04
Payer: MEDICARE

## 2024-04-04 VITALS
SYSTOLIC BLOOD PRESSURE: 154 MMHG | OXYGEN SATURATION: 98 % | WEIGHT: 182 LBS | HEIGHT: 74 IN | BODY MASS INDEX: 23.36 KG/M2 | DIASTOLIC BLOOD PRESSURE: 81 MMHG | HEART RATE: 61 BPM | TEMPERATURE: 97.16 F

## 2024-04-04 DIAGNOSIS — N52.01 ERECTILE DYSFUNCTION DUE TO ARTERIAL INSUFFICIENCY: ICD-10-CM

## 2024-04-04 PROCEDURE — 99213 OFFICE O/P EST LOW 20 MIN: CPT

## 2024-04-04 PROCEDURE — 81003 URINALYSIS AUTO W/O SCOPE: CPT | Mod: QW

## 2024-04-04 NOTE — HISTORY OF PRESENT ILLNESS
[FreeTextEntry1] : 68 YO M  seen on 7/18/2018 for microhematuria, right spermatocele, variable PSA and ED.   Patient seen 9/2/2020  for interval reassessment and treatment. He is working form home in [place] and is urinating with a good stream, good out put stable nocturia x 0-2 and no gross hematuria.   As for his ED, he typically uses sildenafil 100 mg but over the last 4-5 months, he has had difficulty achieving a full erection. He also tells me that it is difficult to always plan in advance using the sildenafil. His right spermatocele sometimes appears to be larger. We changed to tadalafil 5 mg daily blood urine and US tests ordered.  Patient seen   6/25/2021 to review results and reassess the microscopic hematuria, spermatoceles, PSA and ED. US bladder and scrotum from 10/23/2020 showed PVR 83 cc, prostate 40 gm, no testicular masses and bilateral stable spermatoceles. Patient told me that he feels good right now and is urinating well. He has also not noted any significant difference in the right spermatocele. HIs main issue is with his ED for which he is taking tadalafil 5 mg daily with 2 - 3 extra an hour before sexual activity. Despite this Rx, he still does not function consistently. UA trace protein IPSS 5/35 JÚNIOR 17/25 KAYLYN 2/10 PVR 0 We discussed the patient's ongoing issues with erectile dysfunction and I recommended that he maintain the tadalafil 5 mg daily. However I recommended that he try sildenafil citrate 50 mg to 100 mg an hour before sexual activity to augment the tadalafil. Both of these were ordered for him. We discussed the indications risks alternatives and chances for success with this approach. As for his stable right spermatocele, he will be due for an ultrasound in the next 2 to 3 months and I gave him the requisition to have this done in [place]. His microscopic hematuria is also stable and we did send urine today for culture cytology and fish test and blood for PSA exam. If all remains stable I will see him in 1 year.  C+S negative Cytology negative FISH negative PSA 0.84  Patient seen  4/13/2022 to reassess these issues. He continues to urinate well, but also continues to have difficulty with his erections. He is presently taking tadalafil 5 mg daily and supplementing this with sildenafil 199 mg PRN 1 hour before sexual activity. He feels like there is a significant amount of performance anxiety since if things don't progress smoothly, then he gets anxious and everything goes downhill.  We discussed patient's chronic microscopic hematuria which is not present in his urinalysis today. Because of his past history I did send urine for culture and cytology. If these are negative we will continue to follow this in 1 year. As for the BPH, his urinary symptoms are mild and no intervention is indicated at this time again we will follow this in 1 year. As for the ED, he has not enjoyed a good response to the combination of tadalafil 5 mg daily and sildenafil 100 mg as needed on top of this. We discussed other options moving forward and this would require duplex ultrasound evaluation of the penis with ICI as the next step. The goal would be to achieve a full rigid erection using ICI as a therapy if possible. I explained the indications risks alternatives and chances for success with this approach in detail to Mr. Caballero. He is considering this option but at the present time is not yet ready to embark on this path. He requested and I reordered for him the tadalafil 5 mg at [facility] and the sildenafil 100 mg at [facility]. Blood was also sent for PSA test today. If the blood and urine studies remain stable, I will see him in 1 year unless he decides to proceed with penile blood flow testing before that.   Patient seen  6/30/2022 for Duplex US. He informed me that he has not had an adequate response to the double PO PDE5i medication, and scheduled the Penile US. H also told me that he took his last dose of Tadalafil last night.  Patient seen  7/6/2022 for Duplex Penile US. Penile Duplex US wit TRI/STD (5.88/18/0.6): Cavernous artery diameters:      Pre ICI: right 0.4 mm, left 0.6 mm      Post ICI right 0.9 mm, left 0.9 mm Maximum Arterial Velocities:      Right 46 cm/s at 23 minutes after 0.5 cc injection and manual stimulation.      Left 60+ cm/s at 19 minutes after 0.5 cc injection and manual stimulation. Minimum Diastolic Arterial Velocities:      Right 2.5 cm/s      Left 2.2 cm/s Response: Full rigid erection Reassess at 65 minutes - semi erect. At 105 minutes - softer, not completely flaccid. At 180 minutes completely flaccid without intervention. These findings are consistent with arterial ED with good response to a small amount of intracavernous injection therapy but poor response to significant double agent oral medication therapy. I reviewed the option of ICI as a treatment with the patient and he would like to proceed along this course after we reviewed the indications risks alternatives and chances for success. To that effect we made plans for him to return to learn teaching using my technique. We will use 0.3 cc of TRI?STD 5.88 per injection. I reviewed with the patient 4-hour rule concerning prolonged erections and what to do if that should happen.   Patient seen  7/21/2022 to learn ICI. He informed me that he had no untoward effects from the ICI associated with the Duplex Penile US. After reviewing the written information given to him on last visit and this visit, the patient was taught intracavernosal injection therapy using my technique using a combination of Trimix 5.88 at 0.3 cc/inj. This teaching involved initial observation of the technique while I prepared the medication and injected into the penile model followed by patient preparation of the medication and syringe and self injection under my guidance and observation. When the patient performed this self injection sufficiently for his and my satisfaction, then he was given the approval to use self injection therapy with the medicine that he had previously purchased from [employer]. We reviewed the risks potential complications including prolonged erection, we reviewed the steps to be taken if such a prolonged erection should occur and parameters for when to seek more emergent therapy. We also reviewed the need to not use tadalafil orally in combination with the injection therapy and the delay which is necessary between the use of any other medicine for ED therapy. Plans were made to reassess in 3 months, and patient will call when he requires further medication if before that time.   Patient seen  2/8/2023 to reassess. Also due for PDA, TYLER 4/2023. e has been using TRI/STD 5.88 at 0.35 ml/injection with a very satisfactory result that typically lasts 2 hours and spontaneous detumescence. UA negative JÚNIOR 15 Patient remains on Lipitor for his cholesterol and has NKMA.  The patient denies fevers, chills, nausea and or vomiting and no unexplained weight loss. All pertinent parts of the patient PFSH (past medical, family and social histories), laboratory, radiological studies and physician notes were reviewed prior to starting the face to face portion of the telemedicine visit. Questionnaire results were discussed with patient. We discussed the patient's very satisfactory result to intracavernous injection therapy and I recommended that he maintain this technique for his treatment of ED. Reviewed the amount to inject and I suggested he try 0.3 mL/inj. to determine if he could maintain the same rigidity but decrease the duration slightly. We also reviewed the 4-hour rule for prolonged erections and how to proceed if a prolonged erection should occur. I gave him again written information concerning the medication and dosage to use (Sudafed). As for his BPH and PSA, he will be due for PSA and TYLER evaluation in the summer and will make plans to schedule that.  Patient seen 8/3/2023 to reassess ICI and for PSA, TYLER. He continues on ICI, injecting 30-35 units with erections lasting 3-4 hours. UA negative IPSS 10 JÚNIOR 16 KAYLYN 1 We discussed his current dose of TRI/STD and risk of priapism. I advise that he decrease his dose to 25 units to ensure safe use. We also discussed alternatively switching back to daily tadalafil 5 mg, since he prefers the spontaneity effect of the medication. I advised that if he proceeds with this approach, he should wait 3 full days before changing from one to the other medication. Patient should NOT take both medicines at the same time. TRI/STD was renewed to [facility], and tadalafil was sent to [facility] as requested. A PSA is also pending and we will call him with that result. If there are no new problems, he will reassess in 6 - 12 months. LVM stable PSA 1.33. F/u in 6-12 months as planned.  Patient seen TODAY 4/4/2024 to reassess. He tells me that he is doing well. The ICI works well, gives him a full erection, even at 0.15ml/inj. This erection lasts ~2 hours. No other issues.  UA negative IPSS 8 KAYLYN 1 JÚNIOR 20

## 2024-04-04 NOTE — PHYSICAL EXAM
[Normal Appearance] : normal appearance [Well Groomed] : well groomed [General Appearance - In No Acute Distress] : no acute distress [Urethral Meatus] : meatus normal [Penis Abnormality] : normal circumcised penis [Oriented To Time, Place, And Person] : oriented to person, place, and time [Affect] : the affect was normal [Mood] : the mood was normal [Not Anxious] : not anxious [de-identified] : No fibrosis or ecchymosis.

## 2024-04-04 NOTE — ASSESSMENT
[FreeTextEntry1] : WE reviewed the goals of ICI and the 4 hour rule. We discussed trying at 0.1 mi/inj. We also discussed taking the Sudafed after 2 hours of erection. He just received a refill and will notify me when he requires a refill. F/U in 6 months with PSA and TYLER. Aniya Riggs MD

## 2024-04-05 LAB
BILIRUB UR QL STRIP: NEGATIVE
CLARITY UR: CLEAR
COLLECTION METHOD: NORMAL
GLUCOSE UR-MCNC: NEGATIVE
HCG UR QL: 1 EU/DL
HGB UR QL STRIP.AUTO: NEGATIVE
KETONES UR-MCNC: NEGATIVE
LEUKOCYTE ESTERASE UR QL STRIP: NEGATIVE
NITRITE UR QL STRIP: NEGATIVE
PH UR STRIP: 7
PROT UR STRIP-MCNC: NEGATIVE
SP GR UR STRIP: 1.02

## 2024-10-02 ENCOUNTER — NEW REFERRAL (OUTPATIENT)
Dept: URBAN - METROPOLITAN AREA CLINIC 14 | Facility: CLINIC | Age: 69
End: 2024-10-02

## 2024-10-02 DIAGNOSIS — H35.371: ICD-10-CM

## 2024-10-02 DIAGNOSIS — H35.40: ICD-10-CM

## 2024-10-02 DIAGNOSIS — H43.391: ICD-10-CM

## 2024-10-02 DIAGNOSIS — H25.13: ICD-10-CM

## 2024-10-02 DIAGNOSIS — H44.23: ICD-10-CM

## 2024-10-02 DIAGNOSIS — H43.812: ICD-10-CM

## 2024-10-02 PROCEDURE — 92134 CPTRZ OPH DX IMG PST SGM RTA: CPT

## 2024-10-02 PROCEDURE — 99204 OFFICE O/P NEW MOD 45 MIN: CPT

## 2024-10-02 PROCEDURE — 92202 OPSCPY EXTND ON/MAC DRAW: CPT

## 2024-10-02 ASSESSMENT — TONOMETRY
OS_IOP_MMHG: 16
OD_IOP_MMHG: 18

## 2024-10-02 ASSESSMENT — VISUAL ACUITY
OD_CC: 20/25
OS_CC: 20/25+4
OD_PH: 20/20-3
OS_PH: 20/20-1

## 2024-12-13 ENCOUNTER — APPOINTMENT (OUTPATIENT)
Dept: UROLOGY | Facility: CLINIC | Age: 69
End: 2024-12-13
Payer: MEDICARE

## 2024-12-13 VITALS
HEART RATE: 68 BPM | DIASTOLIC BLOOD PRESSURE: 84 MMHG | TEMPERATURE: 97.4 F | SYSTOLIC BLOOD PRESSURE: 146 MMHG | OXYGEN SATURATION: 97 %

## 2024-12-13 DIAGNOSIS — R31.21 ASYMPTOMATIC MICROSCOPIC HEMATURIA: ICD-10-CM

## 2024-12-13 DIAGNOSIS — N52.01 ERECTILE DYSFUNCTION DUE TO ARTERIAL INSUFFICIENCY: ICD-10-CM

## 2024-12-13 DIAGNOSIS — R35.1 NOCTURIA: ICD-10-CM

## 2024-12-13 PROCEDURE — 99214 OFFICE O/P EST MOD 30 MIN: CPT

## 2024-12-13 PROCEDURE — 81003 URINALYSIS AUTO W/O SCOPE: CPT | Mod: QW

## 2024-12-18 ENCOUNTER — NON-APPOINTMENT (OUTPATIENT)
Age: 69
End: 2024-12-18

## 2024-12-18 LAB
BILIRUB UR QL STRIP: NORMAL
CLARITY UR: CLEAR
COLLECTION METHOD: NORMAL
GLUCOSE UR-MCNC: NORMAL
HCG UR QL: 0.2 EU/DL
HGB UR QL STRIP.AUTO: NORMAL
KETONES UR-MCNC: NORMAL
LEUKOCYTE ESTERASE UR QL STRIP: NORMAL
NITRITE UR QL STRIP: NORMAL
PH UR STRIP: 6.5
PROT UR STRIP-MCNC: NORMAL
PSA SERPL-MCNC: 0.88 NG/ML
SP GR UR STRIP: 1.02

## 2025-04-16 ENCOUNTER — FOLLOW UP (OUTPATIENT)
Dept: URBAN - METROPOLITAN AREA CLINIC 14 | Age: 70
End: 2025-04-16

## 2025-04-16 DIAGNOSIS — H43.391: ICD-10-CM

## 2025-04-16 DIAGNOSIS — H44.23: ICD-10-CM

## 2025-04-16 DIAGNOSIS — H35.371: ICD-10-CM

## 2025-04-16 DIAGNOSIS — H43.812: ICD-10-CM

## 2025-04-16 PROCEDURE — 92134 CPTRZ OPH DX IMG PST SGM RTA: CPT

## 2025-04-16 PROCEDURE — 92014 COMPRE OPH EXAM EST PT 1/>: CPT

## 2025-04-16 PROCEDURE — 92202 OPSCPY EXTND ON/MAC DRAW: CPT

## 2025-04-16 ASSESSMENT — TONOMETRY
OD_IOP_MMHG: 15
OS_IOP_MMHG: 16

## 2025-04-16 ASSESSMENT — VISUAL ACUITY
OS_CC: 20/20
OD_CC: 20/30

## 2025-06-09 ENCOUNTER — NON-APPOINTMENT (OUTPATIENT)
Age: 70
End: 2025-06-09

## 2025-06-11 ENCOUNTER — NON-APPOINTMENT (OUTPATIENT)
Age: 70
End: 2025-06-11

## 2025-06-11 ENCOUNTER — APPOINTMENT (OUTPATIENT)
Dept: UROLOGY | Facility: CLINIC | Age: 70
End: 2025-06-11
Payer: MEDICARE

## 2025-06-11 VITALS
SYSTOLIC BLOOD PRESSURE: 169 MMHG | HEART RATE: 64 BPM | DIASTOLIC BLOOD PRESSURE: 87 MMHG | TEMPERATURE: 98.1 F | OXYGEN SATURATION: 98 %

## 2025-06-11 LAB
BILIRUB UR QL STRIP: NORMAL
CLARITY UR: CLEAR
COLLECTION METHOD: NORMAL
GLUCOSE UR-MCNC: NORMAL
HCG UR QL: 0.2 EU/DL
HGB UR QL STRIP.AUTO: NORMAL
KETONES UR-MCNC: NORMAL
LEUKOCYTE ESTERASE UR QL STRIP: NORMAL
NITRITE UR QL STRIP: NORMAL
PH UR STRIP: 6
PROT UR STRIP-MCNC: NORMAL
SP GR UR STRIP: 1.02

## 2025-06-11 PROCEDURE — 81003 URINALYSIS AUTO W/O SCOPE: CPT | Mod: QW

## 2025-06-11 PROCEDURE — 99214 OFFICE O/P EST MOD 30 MIN: CPT
